# Patient Record
Sex: FEMALE | Race: WHITE
[De-identification: names, ages, dates, MRNs, and addresses within clinical notes are randomized per-mention and may not be internally consistent; named-entity substitution may affect disease eponyms.]

---

## 2019-03-06 ENCOUNTER — HOSPITAL ENCOUNTER (OUTPATIENT)
Dept: HOSPITAL 53 - M SDC | Age: 79
LOS: 1 days | Discharge: HOME | End: 2019-03-07
Attending: PLASTIC SURGERY
Payer: MEDICARE

## 2019-03-06 VITALS — DIASTOLIC BLOOD PRESSURE: 71 MMHG | SYSTOLIC BLOOD PRESSURE: 123 MMHG

## 2019-03-06 VITALS — DIASTOLIC BLOOD PRESSURE: 72 MMHG | SYSTOLIC BLOOD PRESSURE: 126 MMHG

## 2019-03-06 VITALS — BODY MASS INDEX: 17.93 KG/M2 | HEIGHT: 64 IN | WEIGHT: 105 LBS

## 2019-03-06 VITALS — DIASTOLIC BLOOD PRESSURE: 75 MMHG | SYSTOLIC BLOOD PRESSURE: 116 MMHG

## 2019-03-06 VITALS — DIASTOLIC BLOOD PRESSURE: 68 MMHG | SYSTOLIC BLOOD PRESSURE: 132 MMHG

## 2019-03-06 VITALS — DIASTOLIC BLOOD PRESSURE: 55 MMHG | SYSTOLIC BLOOD PRESSURE: 106 MMHG

## 2019-03-06 VITALS — SYSTOLIC BLOOD PRESSURE: 117 MMHG | DIASTOLIC BLOOD PRESSURE: 68 MMHG

## 2019-03-06 DIAGNOSIS — C44.729: Primary | ICD-10-CM

## 2019-03-06 DIAGNOSIS — Z79.899: ICD-10-CM

## 2019-03-06 DIAGNOSIS — Z88.2: ICD-10-CM

## 2019-03-06 DIAGNOSIS — Z78.0: ICD-10-CM

## 2019-03-06 DIAGNOSIS — F41.9: ICD-10-CM

## 2019-03-06 DIAGNOSIS — E78.2: ICD-10-CM

## 2019-03-06 DIAGNOSIS — I48.2: ICD-10-CM

## 2019-03-06 PROCEDURE — 88331 PATH CONSLTJ SURG 1 BLK 1SPC: CPT

## 2019-03-06 PROCEDURE — 88332 PATH CONSLTJ SURG EA ADD BLK: CPT

## 2019-03-06 PROCEDURE — 15110 EPIDRM AGRFT T/A/L 1ST 100: CPT

## 2019-03-06 PROCEDURE — 88305 TISSUE EXAM BY PATHOLOGIST: CPT

## 2019-03-06 PROCEDURE — 11606 EXC TR-EXT MAL+MARG >4 CM: CPT

## 2019-03-06 RX ADMIN — CEFAZOLIN SODIUM SCH MLS/HR: 1 INJECTION, POWDER, FOR SOLUTION INTRAMUSCULAR; INTRAVENOUS at 16:32

## 2019-03-06 RX ADMIN — SODIUM CHLORIDE, POTASSIUM CHLORIDE, SODIUM LACTATE AND CALCIUM CHLORIDE SCH MLS/HR: 600; 310; 30; 20 INJECTION, SOLUTION INTRAVENOUS at 12:23

## 2019-03-06 NOTE — POST-OPPD
Postoperative Procedure Note


Date Of Procedure:  Mar 6, 2019


PREOPERATIVE DIAGNOSIS: Left lower leg malignant lesion





POSTOPERATIVE DIAGNOSIS: same





FINDINGS: malignant lesion left lower leg 3x3.5cm 





PROCEDURE: Excision malignant lesion left lower leg with skin graft closure. 

Donor site left lower abdomen.





SURGEON: Dr Love





ANESTHESIA: General





SPECIMENS: 1. Left lower leg malignant lesion FS, suture at 12 o'clock, 2. 

Additional deep margin FS, 3. Additional deep margin 9-12 o'clock. 





ESTIMATED BLOOD LOSS: 5cc





REPLACED: none





DRAINS: none





COMPLICATIONS: none





POSTOPERATIVE CONDITION: stable











ANOOP LOVE DO               Mar 6, 2019 11:25

## 2019-03-07 VITALS — DIASTOLIC BLOOD PRESSURE: 62 MMHG | SYSTOLIC BLOOD PRESSURE: 105 MMHG

## 2019-03-07 VITALS — SYSTOLIC BLOOD PRESSURE: 120 MMHG | DIASTOLIC BLOOD PRESSURE: 71 MMHG

## 2019-03-07 RX ADMIN — SODIUM CHLORIDE, POTASSIUM CHLORIDE, SODIUM LACTATE AND CALCIUM CHLORIDE SCH MLS/HR: 600; 310; 30; 20 INJECTION, SOLUTION INTRAVENOUS at 08:00

## 2019-03-07 RX ADMIN — CEFAZOLIN SODIUM SCH MLS/HR: 1 INJECTION, POWDER, FOR SOLUTION INTRAMUSCULAR; INTRAVENOUS at 00:00

## 2019-03-07 NOTE — IPNPDOC
Subjective


General


Date/Time Seen


The patient was seen on 3/7/19 at 07:24.





Subject


Chief Complaint/History


The patient is a 78-year-old female admitted with a reason for visit of Squamous

Cell Carcinoma Left Lower Leg excision with skin graft placement. POD 1. Doing 

well.





Current Medications


Current Medications





Current Medications


Acetaminophen/ Codeine Phosphate (Tylenol/Codeine #3 Tablet) 1 ea Q4H  PRN PO 

PAIN;  Start 3/6/19 at 12:00


Cefazolin Sodium 1 gm/Dextrose 50 ml @  100 mls/hr Q8H IV  Last administered on 

3/7/19at 00:00;  Start 3/6/19 at 16:00;  Stop 3/7/19 at 00:29;  Status DC


Fentanyl Citrate (Sublimaze) 25 mcg Q5MP  PRN IV MODERATE PAIN (PS 4-7);  Start 

3/6/19 at 12:00;  Stop 3/6/19 at 13:00;  Status DC


Hydromorphone HCl (Dilaudid) 0.5 mg Q15M  PRN IV MODERATE/SEVERE PAIN (PS 5-10);

 Start 3/6/19 at 12:00


Lactated Ringer's 1,000 ml @  50 mls/hr Q20H IV  Last administered on 3/6/19at 

12:23;  Start 3/6/19 at 12:00


Ondansetron HCl (ZOFRAN INJection) 4 mg Q6H  PRN IV NAUSEA;  Start 3/6/19 at 

12:00





Allergies


Coded Allergies:  


     Sulfa Drugs (Unverified  Allergy, Unknown, 2/20/19)


     Sulfa Drugs Cross Reactors (Unverified  Allergy, Unknown, 2/20/19)





Objective


Physical Examination


Examination


GENERAL APPEARANCE:Patient seen, laying in bed, awake, alert, and oriented. 

Comfortable, in no acute distress.


SKIN: Warm and moist. Donor site left lower abdomen incision intact. Recipient 

site left LE, dressing in place. 


HEENT: Normocephalic,  atraumatic. Pink palpebral conjunctiva, anicteric 

sclerae. Lips and mucosa appear moist.


NECK: Supple, no thyromegaly. No obvious jugular venous distention.


LUNGS: Clear to auscultation bilaterally. No wheezing appreciated.


HEART: No chest wall abnormalities. Regular rate and rhythm with no murmurs 

appreciated.


ABDOMEN: Abdomen soft NT/ND. Incision intact.


Vital Signs





Vital Signs








  Date Time  Temp Pulse Resp B/P (MAP) Pulse Ox O2 Delivery O2 Flow Rate FiO2


 


3/7/19 06:00 98.0 80 16 120/71 (87) 96   








I&Os











I&O- Last 24 Hours up to 6 AM 


 


 3/7/19





 06:00


 


Intake Total 2120 ml


 


Output Total 450 ml


 


Balance 1670 ml











Impression


Left lower leg SCC. S/p excision and skin graft coverage. 


Stable


Pain controlled


D/c home today.


Limited motion of the left leg.


Plan to remove bolster in 7 days.


F/up plastic surgery 1 week





Plan / VTE


VTE Prophylaxis Ordered?:  Yes











ANOOP LOVE DO               Mar 7, 2019 07:26

## 2019-03-07 NOTE — RO
DATE OF OPERATION:  03/06/2019

 

PREOPERATIVE DIAGNOSIS:  Left lower leg malignant lesion.

 

POSTOPERATIVE DIAGNOSIS:  Left lower leg malignant lesion.

 

PROCEDURE:  Excision malignant lesion left lower leg with skin graft closure.

Donor site was left lower abdomen.

 

ATTENDING SURGEON:  Corinne Valenzuela DO

 

ANESTHESIA:  General.

 

SPECIMENS SENT:  As follows:

1.  Left lower leg malignant lesion, frozen section, suture marking 12 o'clock.

2.  Additional deep margin, frozen section.

3.  Additional deep margin from 9 o'clock to 12 o'clock, frozen section.

 

BLOOD LOSS:  5 mL.

 

No replacement needed.

 

No drains.

 

No complications.

 

DESCRIPTION OF PROCEDURE:  This is a 78-year-old female who presents in our

office with a large fungating lesion on her left lower extremity, which the

patient saying rapidly start to grow in the past month or so.  No active

bleeding.  The patient is scheduled to have this lesion removed.  It is at the

lower anterior leg right over the shin with minimal skin viability there; and for

closure, we proposed the patient with a skin graft closure, full thickness, and

the patient agrees.  The risks and benefits and alternatives of the procedure

discussed with the patient at length on the day of surgery.  Informed consent

confirmed.  The patient is ready to proceed.

 

DESCRIPTION OF PROCEDURE:  She was brought in to the operating room and placed in

supine position.  Sequential stockings placed on the lower right leg.

Preoperative antibiotics are given.  She was prepped and draped in the usual

sterile fashion.  We prepped out the left lower leg and the left lower abdomen

that is going to be used as a donor site.

 

We started our procedure by outlining the lesion with 6-mm margins and started

our incision.  The specimen is marked with a suture at 12 o'clock, was completely

removed in its full thickness and through the fascia.  There appears to be

involvement of the lesion, so the main lesion is sent out.  There is questionable

tissue in the middle of the deep margin, which was also tangentially resected and

sent off frozen section, and then additional margin needed per pathology, and we

sent an additional margin between 9 o'clock and 12 o'clock on a deep section, as

well, which came back clear.  After all the clear margins were confirmed, the

wound is irrigated, hemostasis obtained, and then we harvested the graft from the

left lower abdomen which would match by skin in the left lower extremity.  The

graft was harvested, and then it was defatted and sutured in place with 4-0

chromic sutures into the defect of the left lower extremity, which is totaling at

this point 4 x 4.5 cm.  The lesion before the excision, the lesion itself was 3 x

3.5 cm of the full excisions and with margins we end up with 4 x 4.5 cm open

wound.  The donor site was closed in layers with interrupted 3-0 Monocryl sutures

and 4-0 Monocryl sutures.  Covered with Steri-Strips and gauze, and the

________________dressing was created for the graft, after was sutured in, and

they were sutured in with 4-0 nylon sutures with a Xeroform and a gauze.

Compression dressing is placed in the left lower extremity, as well

 

The patient is extubated in the operating room without any difficulties and

transferred to the recovery room in stable condition.

## 2020-10-26 ENCOUNTER — HOSPITAL ENCOUNTER (OUTPATIENT)
Dept: HOSPITAL 53 - M LAB REF | Age: 80
End: 2020-10-26
Attending: DERMATOLOGY
Payer: MEDICARE

## 2020-10-26 DIAGNOSIS — C44.722: Primary | ICD-10-CM

## 2020-12-15 ENCOUNTER — HOSPITAL ENCOUNTER (OUTPATIENT)
Dept: HOSPITAL 53 - M LAB REF | Age: 80
End: 2020-12-15
Attending: DERMATOLOGY
Payer: MEDICARE

## 2020-12-15 DIAGNOSIS — L57.0: Primary | ICD-10-CM

## 2024-12-16 ENCOUNTER — HOSPITAL ENCOUNTER (OUTPATIENT)
Dept: HOSPITAL 53 - M SDC | Age: 84
Discharge: HOME | End: 2024-12-16
Attending: OPHTHALMOLOGY
Payer: MEDICARE

## 2024-12-16 VITALS — TEMPERATURE: 97.8 F | DIASTOLIC BLOOD PRESSURE: 80 MMHG | OXYGEN SATURATION: 97 % | SYSTOLIC BLOOD PRESSURE: 145 MMHG

## 2024-12-16 VITALS — BODY MASS INDEX: 18.78 KG/M2 | HEIGHT: 63 IN | WEIGHT: 106 LBS

## 2024-12-16 DIAGNOSIS — Z88.2: ICD-10-CM

## 2024-12-16 DIAGNOSIS — H25.9: Primary | ICD-10-CM

## 2024-12-16 DIAGNOSIS — Z79.899: ICD-10-CM

## 2024-12-16 DIAGNOSIS — M81.0: ICD-10-CM

## 2024-12-16 PROCEDURE — 66984 XCAPSL CTRC RMVL W/O ECP: CPT

## 2024-12-16 RX ADMIN — CEFUROXIME ONE MG: 750 INJECTION, POWDER, FOR SOLUTION INTRAMUSCULAR; INTRAVENOUS at 13:36

## 2024-12-16 RX ADMIN — LIDOCAINE HYDROCHLORIDE ONE ML: 10 INJECTION, SOLUTION EPIDURAL; INFILTRATION; INTRACAUDAL; PERINEURAL at 13:36

## 2024-12-16 RX ADMIN — FLURBIPROFEN SODIUM SCH DROP: 0.3 SOLUTION/ DROPS OPHTHALMIC at 12:08

## 2024-12-16 RX ADMIN — TETRACAINE HYDROCHLORIDE SCH DROP: 5 SOLUTION OPHTHALMIC at 12:08

## 2024-12-16 RX ADMIN — PHENYLEPHRINE HYDROCHLORIDE SCH DROP: 25 SOLUTION/ DROPS OPHTHALMIC at 12:08

## 2024-12-16 RX ADMIN — CYCLOPENTOLATE HYDROCHLORIDE SCH DROP: 10 SOLUTION/ DROPS OPHTHALMIC at 12:08

## 2025-03-12 ENCOUNTER — HOSPITAL ENCOUNTER (OUTPATIENT)
Dept: HOSPITAL 53 - M SFHCDERM | Age: 85
End: 2025-03-12
Attending: PHYSICIAN ASSISTANT
Payer: MEDICARE

## 2025-03-12 DIAGNOSIS — C44.311: Primary | ICD-10-CM

## 2025-04-14 ENCOUNTER — HOSPITAL ENCOUNTER (OUTPATIENT)
Dept: HOSPITAL 53 - M SDC | Age: 85
Discharge: HOME | End: 2025-04-14
Attending: OPHTHALMOLOGY
Payer: MEDICARE

## 2025-04-14 VITALS — BODY MASS INDEX: 19.17 KG/M2 | WEIGHT: 108.2 LBS | HEIGHT: 63 IN

## 2025-04-14 VITALS — SYSTOLIC BLOOD PRESSURE: 142 MMHG | OXYGEN SATURATION: 96 % | DIASTOLIC BLOOD PRESSURE: 72 MMHG | TEMPERATURE: 97 F

## 2025-04-14 DIAGNOSIS — Z79.899: ICD-10-CM

## 2025-04-14 DIAGNOSIS — H25.11: Primary | ICD-10-CM

## 2025-04-14 DIAGNOSIS — Z88.2: ICD-10-CM

## 2025-04-14 PROCEDURE — 66984 XCAPSL CTRC RMVL W/O ECP: CPT
